# Patient Record
Sex: FEMALE | Race: WHITE
[De-identification: names, ages, dates, MRNs, and addresses within clinical notes are randomized per-mention and may not be internally consistent; named-entity substitution may affect disease eponyms.]

---
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
## 2017-09-23 NOTE — OBDEL
Birth Info


Birth Type: Vaginal


Presentation at Delivery: Vertex


L&D Analgesia/Anesthesia Type: Epidural


GBS+: Yes


Antibiotic Used for + GBS: Ampicillin (x3)


Intrapartum Medications: 





 











Generic Name Dose Route Start Last Admin





  Trade Name Frebrittany  PRN Reason Stop Dose Admin


 


Ampicillin Sodium 1 gm/ Sodium  100 mls @ 200 mls/hr  17 11:30  17 

19:33





  Chloride  IV  10/23/17 11:29  100 mls





  Q4H JESUS ALBERTO   Administration





  Protocol   


 


Oxytocin 30 unit/ Lactated  503 mls @ 0 mls/hr  17 13:00  17 13:08





  Ringer's  IV  18 12:59  Not Given





  CONT JESUS ALBERTO   





  Protocol   





  Per Protocol   














Discontinued Medications














Generic Name Dose Route Start Last Admin





  Trade Name Freq  PRN Reason Stop Dose Admin


 


Ampicillin Sodium 1 gm/ Sodium  100 mls @ 200 mls/hr  17 13:37  17 

11:38





  Chloride  IV  10/23/17 13:36  100 mls





  Q4H JESUS ALBERTO   Administration





  Protocol   














- Hospital Course


Intrapartum: 


Presents with +LOF at 0300-clear and occ ctx's. Pregnancy complicated by Rh 

negative-s/p Rhogam; Rubella non-immune. Hypothyroid-pt stopped Levo about 1 

week ago. Just got Tdap and wants flu vaccine. GBS+


17 23:41





Indications for Delivery: SROM





Vaginal Delivery





- Delivery Provider


Delivery Physician/CNM: Merna Castro





- Labor and Delivery


Onset of Contractions Date: 17


Onset of Contractions Time: 03:30


Onset of Contractions Type: Augmented


Rupture of Membranes Date: 17


Rupture of Membranes Time: 03:00


Rupture of Membranes Type: Spontaneous


Amniotic Fluid Color: Clear


Dilation Complete Date: 17


Dilation Complete Time: 21:55


Placenta Delivery Date: 17


Placenta Delivery Time: 23:15


Total Hours of Labor: 19


Laceration: 2nd Degree


Repair: 3-0, Vicryl


Vaginal Sponge Count Correct: Yes


Vaginal Needle Count Correct: Yes


Vaginal Sweep Performed: Yes


EBL: 300cc


Delivery Events: None


Delivery Comment: 





Uncomplicated





- Medications


Labor Augmentation/Induction Methods Used: Pitocin


Labor Augmentation/Induction Indication: Inadequate Ctx Strength (No cervical 

change)





[place] Birth Data


  ** Torres


Delivery Date: 17


Delivery Time: 23:10


LAZARO: 17


Gestational Age: 39 week(s) and 4 day(s)


Sex of Infant: Female


Apgar Score (1 Min): 8


Apgar Score (5 Min): 9





ICD10 Worksheet


Patient Problems: 


 Problems











Problem Status Onset


 


SROM (spontaneous rupture of membranes) Acute  


 


 (spontaneous vaginal delivery) Acute  














- ICD10 Problem Qualifiers


(1) SROM (spontaneous rupture of membranes)








(2)  (spontaneous vaginal delivery)

## 2017-09-23 NOTE — PREANESOB
Obstetric Pre-Anesthesia Info





- General Info


: 2


Para: 0





- Fetal Info


Fetal Status: Full Term


Fetal Monitors: External





- Labor Status


Cervical Dilation per last OB SVE: 3


Pitocin: In Use


Labor Epidural: Yes





Anesthesia


Allergies/Adverse Reactions: 


 











Allergy/AdvReac Type Severity Reaction Status Date / Time


 


No Known Allergies Allergy   Verified 12 02:33











Home Medications: 














 Medication  Instructions  Recorded


 


Fish Oil 1,200 mg Fish Oil 1 tab PO DAILY 17


 


Levothyroxine 0.25 mcg PO DAILY 17


 


Prenatal 1 tab PO DAILY 17


 


ZyrTEC 10 mg (*) 10 mg PO DAILY 17











Visit Medications: 





 











Generic Name Dose Route Start Last Admin





  Trade Name Freq  PRN Reason Stop Dose Admin


 


Lactated Ringer's  1,000 mls @ 0 mls/hr  17 09:36  





  Lr  IV  18 09:35  





  PRN PRN   





  SEE PROTOCOL CONDITIONS   





  Protocol   





  Per Protocol   


 


Oxytocin 20 unit/ Lactated  1,002 mls @ 150 mls/hr  17 09:46  





  Ringer's  IV   





  PRN PRN   





  POST-PARTUM BLEEDING   


 


Ampicillin Sodium 1 gm/ Sodium  100 mls @ 200 mls/hr  17 11:30  17 

15:55





  Chloride  IV  10/23/17 11:29  100 mls





  Q4H JESUS ALBERTO   Administration





  Protocol   


 


Lactated Ringer's  500 mls @ 500 mls/hr  17 12:24  





  Lr  IV   





  PRN PRN   





  Maternal Hypotension   


 


Oxytocin 30 unit/ Lactated  503 mls @ 0 mls/hr  17 13:00  17 13:08





  Ringer's  IV  18 12:59  Not Given





  CONT JESUS ALBERTO   





  Protocol   





  Per Protocol   


 


Ibuprofen  600 mg  17 09:36  





  Motrin  PO  18 09:35  





  Q6HRS PRN   





  post partum, inflammation   


 


Magnesium Sulfate  454 gm  17 09:36  





  Epsom Salt  TP  18 09:35  





  Q1H PRN   





  perineal discomfort    


 


Olive Oil  118 ml  17 09:36  





  Sweet Oil  MISC  18 09:35  





  ONCE PRN   





  perineal massage   


 


Terbutaline Sulfate  0.25 mg  17 09:36  





  Brethine  IV  18 09:35  





  ONCE PRN   





  Tachysystole   














Discontinued Medications














Generic Name Dose Route Start Last Admin





  Trade Name Evangelina  PRN Reason Stop Dose Admin


 


Ammonia (Aromatic Spirit)  Confirm  17 13:00  





  Ammonia Aromatic  Administered  17 13:01  





  Dose   





  1 each   





  IH   





  .STK-MED ONE   


 


Bupivacaine HCl  Confirm  17 15:21  





  Sensorcaine 0.25% Sdv  Administered  17 15:22  





  Dose   





  30 ml   





  .ROUTE   





  .STK-MED ONE   


 


Ephedrine Sulfate  Confirm  17 13:00  





  Ephedrine Sulfate  Administered  17 13:01  





  Dose   





  50 mg   





  .ROUTE   





  .STK-MED ONE   


 


Fentanyl/Bupivacaine HCl  Confirm  17 15:22  





  Fentanyl/Bupivacaine/Ns 2 Mcg/Ml 0.1% (Premix  Administered  17 15:23  





  Dose   





  100 ml   





  EP   





  .STK-MED ONE   


 


Ampicillin Sodium 1 gm/ Sodium  100 mls @ 200 mls/hr  17 13:37  17 

11:38





  Chloride  IV  10/23/17 13:36  100 mls





  Q4H JESUS ALBERTO   Administration





  Protocol   


 


Oxytocin/Lactated Ringer's  1,000 mls @ 150 mls/hr  17 09:36  





  Pitocin 20 Units/Lr (Premix)  IV   





  PRN PRN   





  Post-partum bleeding   


 


Oxytocin/Lactated Ringer's  500 mls @ 0 mls/hr  17 12:30  





  Pitocin 30 Units/Lr (Premix)  IV  18 12:29  





  CONT JESUS ALBERTO   





  Protocol   





  Per Protocol   


 


Lidocaine HCl  Confirm  17 13:00  





  Lidocaine Hcl 1%  Administered  17 13:01  





  Dose   





  300 mg   





  .ROUTE   





  .STK-MED ONE   


 


Misoprostol  Confirm  17 13:01  





  Cytotec  Administered  17 13:02  





  Dose   





  800 mcg   





  .ROUTE   





  .STK-MED ONE   


 


Olive Oil  Confirm  17 13:00  





  Sweet Oil  Administered  17 13:01  





  Dose   





  118 ml   





  .ROUTE   





  .STK-MED ONE   


 


Oxytocin  Confirm  17 13:01  





  Pitocin  Administered  17 13:02  





  Dose   





  40 unit   





  .ROUTE   





  .STK-MED ONE   


 


Phenylephrine HCl  Confirm  17 15:52  





  Neosynephrine  Administered  17 15:53  





  Dose   





  1,000 mcg   





  .ROUTE   





  .STK-MED ONE   


 


Terbutaline Sulfate  Confirm  17 13:00  





  Brethine  Administered  17 13:01  





  Dose   





  1 mg   





  .ROUTE   





  .STK-MED ONE   














- Anesthesia History


Response to Local Anesthetics: Normal


Anesthesia & Operative History: No Prior Problems





- Social History


Substance Use/Abuse: Denies





- Focused Exam


Height/Weight (Nursing): 





 











Height                         162.56 cm


 


Weight                         80.739 kg














ASA Status: I


Labs: 





 





 17 08:25 





 











Patient ABO/Rh  A NEGATIVE   17  08:25

## 2017-09-24 NOTE — OBPP
PostPartum Progress Note


Assessment/Plan: 


Assessment:











31 yo  PPD#1 s/p  on 17. Doing well.














Plan: Routine PP care.





17 13:27





Subjective/Postpartum Course: 





17 13:27


Patient is doing well. She is breastfeeding, she denies significant pain--well 

controlled with ibuprofen. She is ambulating and reports minimal lochia.


Objective: 





 





 17 08:25 





 











Patient ABO/Rh  A NEGATIVE   17  08:25    








 











Temp Pulse Resp BP Pulse Ox


 


 36.6 C   78   16   103/71   95 


 


 17 08:00  17 08:00  17 08:00  17 08:00  17 08:00








Gen: AAO x 3


HENT: atraumatic, normocephalic


Heart: RRR, no m/r/g


Lungs: CTAB


Abdomen: soft, non-tender, FF and below umbilicus


Extremities: no excessive swelling in BLE


Uterine Position/Fundal Height: Umbilicus -2


Uterine Tone: Firm

## 2017-09-25 NOTE — OBPP
PostPartum Progress Note


Assessment/Plan: 


Assessment:








1)  s/p  PPD # 2 - pt is stable





2)  Rh negative - Rhogam eval





3)  Rubella non-immune - to get MMR











Plan:





Plan for d/c home at noon


Instructions reviewed with pt


No RX given


Cont PNV and colace


Pelvic rest


RTC in 4 and 6 weeks for pp visit











17 09:44





Subjective/Postpartum Course: 





17 13:27


Patient is doing well. She is breastfeeding, she denies significant pain--well 

controlled with ibuprofen. She is ambulating and reports minimal lochia.





17 09:41


Pt seen and examined. Doing well with no complaints. Mild cramping. Lala regular 

diet, voiding and passing flatus. No BM yet. Mod lochia. BF well, but has 

really sore nipples. She has been working with lactation.











Objective: 





 





 17 08:25 





 











Patient ABO/Rh  A NEGATIVE   17  08:25    








 











Temp Pulse Resp BP Pulse Ox


 


 36.1 C   88   16   101/60   93 


 


 17 21:08  17 21:08  17 21:08  17 21:08  17 21:08











Uterine Position/Fundal Height: Umbilicus -2


Uterine Tone: Firm





PostPartum Physical Exam





- Physical Exam


Respiratory: lungs clear, normal breath sounds


Cardiac/Chest: regular rate, rhythm


Abdomen: normal bowel sounds, non-tender, soft, flatus (+)


Extremities: non-tender, normal inspection


Skin: normal color, warm/dry


Neuro/Psych: alert, normal mood/affect, oriented x 3

## 2017-09-25 NOTE — OBGCSDC
General Delivery Information





- General Info


: 2


Para: 1


Abortions: 0


Birth Type: Vaginal


L&D Analgesia/Anesthesia Type: Epidural


Admission Date: 17


Labs: 





 











Patient ABO/Rh  A NEGATIVE   17  08:25    


 


Hct  35.9 % (38.0-47.0)  L  17  08:25    














- Hospital Course


Antepartum: 





17 09:45


Pregnancy mostly uncomplicated. Rh negative, s/p Rhogam; Rubella non-immune. 

Hypothyroid-pt stopped Levo about 1 week ago. Just got Tdap and wants flu 

vaccine. GBS+








Intrapartum: 


Presents with +LOF at 0300-clear and occ ctx's.


17 23:41











Postpartum: 





17 13:27


Patient is doing well. She is breastfeeding, she denies significant pain--well 

controlled with ibuprofen. She is ambulating and reports minimal lochia.





17 09:41


Pt seen and examined. Doing well with no complaints. Mild cramping. Lala regular 

diet, voiding and passing flatus. No BM yet. Mod lochia. BF well, but has 

really sore nipples. She has been working with lactation.














Vaginal Birth





- Delivery Provider


Delivery Physician/CNM: Merna Castro





- Diagnosis


Labor: Augmented


Rupture of Membranes Type: Spontaneous


Amniotic Fluid Color: Clear


Laceration: 2nd Degree


Repair: 3-0, Vicryl


Delivery Events: None





 Birth





-  Delivery


EBL: 300cc





 Birth Data


  ** Torres


Delivery Date: 17


Delivery Time: 23:10


LAZARO: 17


Gestational Age: 39 week(s) and 6 day(s)


Sex of Infant: Female


 Weight (gm): 3050 g


Apgar Score (1 Min): 8


Apgar Score (5 Min): 9





Discharge Information





- Discharge Information


Condition: Good


Instruction/Follow Up: Four Weeks, Six Weeks

## 2018-04-17 NOTE — GHP
[f rep st]



                                                       PREOP HISTORY AND PHYSICAL





DATE OF ADMISSION:  04/18/2018



Patient is slated for surgery on 04/18/2018, on the Gynecology service.



HISTORY UPON PRESENTATION:  Patient is a 33-year-old, G3, P1, A1, who has been diagnosed with a misse
d AB at 8 weeks gestation.  She was found to have no fetal cardiac activity.  Visually, this was a pr
egnancy that looked to be a monochorionic, monoamniotic twin pregnancy that was likely a conjoined tw
in, which looked consistent with two separate cranial upper torso structures, but joined in a single 
lower torso structure, with a single cardiac structure.  The patient had previously been seen with duncan nguyen for an abnormally formed pregnancy on April 6, and there was a heart beat at that point of 14
8 beats per minute.  The patient has continued to have normal pregnancy symptoms and has not had any 
bleeding or cramping.  The patient is advised as to the options of management of missed AB, and she a
nd her  want to proceed with a D and C for definitive management.  The patient knows the optio
n of awaiting a miscarriage versus using Cytotec to induce a miscarriage.  The patient understands th
ere is low risk of complications or subsequent issues with scar tissue for her uterus.  Full informat
ion about the procedure will be discussed at bedside with Dr. Castro on the day of surgery, and the c
onsent forms will be signed then.  The patient has A-negative blood type, and is aware that she will 
want to have RhoGAM after the procedure in the hospital.



PAST MEDICAL HISTORY:  Abnormal Pap smear, at which time a colposcopy was performed in 2006, and the 
pathology was benign.  The patient had infertility issues in 2016 and saw [facility], and had an intrauteri
ne insemination.  This pregnancy was spontaneous and was unplanned, but welcomed.  The patient was co
ncerned when the pregnancy looked malformed, and has mixed emotions now about the pregnancy loss.  Th
e patient has a history of asthma and had a workup at [facility] that was negative.  The patient
 with hypothyroidism.  History of several UTIs and history of pyelonephritis.



PAST SURGICAL HISTORY:  Sinus surgery in 2005, omentectomy in the past.



PREGNANCY HISTORY:  Patient had a spontaneous AB at 4-5 weeks gestation in November 2016.  She did no
t receive RhoGAM at that time.  In September 2017, she had a viable female at 6 pounds 11.5 ounces by
 vaginal delivery with an epidural.



ALLERGIES:  The patient reports an allergy to doxycycline, with symptoms of headache and blurred visi
on.



CURRENT MEDICATIONS:  Levothyroxine at 25 mcg a day, prenatal vitamins, DHEA.



SOCIAL HISTORY:  The patient is , lives with her , Dimitrios, and their daughter, Nereida.  The
 patient is a nonsmoker.  No alcohol or drug use.



PHYSICAL EXAMINATION:  GENERAL:  At the time of preop, the patient is a well-developed, well-nourishe
d white female in no physical discomfort, but emotionally upset after the ultrasound.  VITAL SIGNS:  
The patient is clinically afebrile.  Blood pressure 98/70.  LUNGS:  Clear to auscultation bilaterally
.  CARDIOVASCULAR:  Regular rate and rhythm.  ABDOMEN:  Soft and nontender.  PELVIC:  Exam is not per
formed.



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  Patient wants to proceed with D and C for zheng terry, and this is scheduled on April 18.



PLAN:  The patient will be n.p.o. prior to the procedure.  She will have preoperative antibiotics, an
d she will receive RhoGAM after the procedure.  The patient is breastfeeding her 7-month-old and woul
d like to minimize anesthesia to continue breastfeeding.  Advised the patient to take her thyroid on 
schedule, but to defer her vitamins.





Job #:  [id number]

## 2018-04-18 NOTE — GOP
[f 
rep st]



                                                                OPERATIVE REPORT





DATE OF OPERATION:  18



SURGEON:  Merna Castro DO



ASSISTANT:  None.



ANESTHESIA:  LMA.



ANESTHESIOLOGIST:  Cuauhtemoc Mcknight MD.



PREOPERATIVE DIAGNOSIS:  Missed  at 8 weeks.



POSTOPERATIVE DIAGNOSIS:  Missed  at 8 weeks.



PROCEDURE PERFORMED:  Suction dilatation and curettage.



EBL:  100 cc



IVFs: 200 cc LR



FINDINGS:  On bimanual exam, the uterus was approximately 9-10 weeks, anteverted
, mobile.  Cervix was closed with no active bleeding noted.  The uterus sounded 
to 9-10 cm.  There was a moderate amount of tissue obtained. Minimal bleeding 
noted at the end of the procedure.



SPECIMENS:  Products of conception.



INDICATIONS:  Patient is a 33-year-old  3, para 1-0-1-1, with a missed 
AB at 8 weeks.  Patient presented to the office about a week ago with positive 
pregnancy test and had an ultrasound showing a viable mono-mono TIUP, however, 
likely conjoined with only one chest cavity and one heart.  Patient had a 
follow up visit with [facility] and had an ultrasound showing no heartbeat and a 
demise at 8 weeks.  Patient decided she wanted to proceed with surgery, suction 
D and C for removal of products of conception.  Surgical consents were obtained 
at bedside.  Discussed risks, benefits, and alternatives with the patient 
including, but not limited to, bleeding, infection, and risk of uterine 
perforation.  The patient understands all risks of surgery and wants to proceed 
with surgery.  



DESCRIPTION OF PROCEDURE:  Patient was taken to the operating room, where 
anesthesia was obtained without difficulty.  She was then positioned in the 
dorsal lithotomy position, prepped and draped in normal sterile fashion.  Once 
the anesthetic was found to be adequate, bimanual exam was performed.  Next, an 
open-ended speculum was placed in the vagina.  The anterior lip of the cervix 
was grasped with an Allis clamp.  The cervix was noted to be closed.  It was 
dilated up to a #9 Hegar.  The uterus was then sounded to 9-10 cm.  A curved 9 
suction curette was then used, connected to suction, placed in the cervix and 
gently passed up into the uterus.  A suction curettage was performed multiple 
times.  This was done under ultrasound guidance to visualize removal of 
products of conception.  Next, we turned our attention to sharp curettage.  
This was performed, only obtaining a small amount of tissue, and was done until 
a gritty texture was noted in all 4 quadrants of the uterus.  We then followed 
this with suction curettage to make sure there was removal of any remaining 
products of conception.  This was done under ultrasound, and again, a thin 
stripe was noted with no retaining products.  All instruments were then removed 
from the vagina. Minimal bleeding was noted. The patient tolerated the 
procedure well.  No complications.     Instruments, lap counts were correct x2. 
The patient was then taken out of lithotomy position, awakened, and taken to 
recovery room in stable condition.The patient is Rh negative, and will receive 
RhoGAM postoperatively in the PACU.    







Job #:  [id number]

MTDD

## 2018-04-18 NOTE — POSTOPPROG
Post Op Note


Date of Operation: 18


Surgeon: Merna Castro


Assistant: None


Anesthesiologist: Dr. Mcknight


Anesthesia: LMA


Pre-op Diagnosis: Missed Ab @ 8 weeks


Post-op Diagnosis: Missed Ab at 8 weeks


Indication: 34 y/o  w/ missed Ab at 8 wks; u/s showed mo-mo twins, likely 

conjoined


Procedure: Suction D&C under u/s guidance


Findings: Uterus sounded to 9-10 cm; cervix closed; mod amount POCs noted


Inf/Abcess present in the surg proc area at time of surgery?: No


Depth: Organ Space


EBL:  (100 cc)


Total fluids administered: 200 cc LR


Complications: 





None


Drains: Constavac


Specimen(s): 





POCs

## 2018-04-18 NOTE — PDANEPAE
ANE History of Present Illness


retained POC here for D+C








ANE Past Medical History





- Cardiovascular History


Hx Hypertension: No


Hx Arrhythmias: No


Hx Chest Pain: No





- Pulmonary History


Hx Sleep Apnea: No


Sleep Apnea Screening Result - Last Documented: Negative





- Endocrine History


Hx Diabetes: No


Hypothyroid: Yes





- Chronic Pain History


Chronic Pain: No





ANE Review of Systems


Review of Systems: 








- Exercise capacity


Exercise capacity: >=4 METS





ANE Patient History





- Allergies


Allergies/Adverse Reactions: 








doxycycline Allergy (Verified 04/18/18 06:47)


 








- Home Medications


Home Medications: 








Levothyroxine [Synthroid 25 mcg (*)]  04/18/18 [Last Taken 04/18/18 05:45]








- NPO status


NPO Status: no food or drink >8 hours


NPO Since - Liquids (Date): 04/17/18


NPO Since - Liquids (Time): 20:45


NPO Since - Solids (Date): 04/17/18


NPO Since - Solids (Time): 21:50





- Anes Hx


Anes Hx: no prior problems





- Smoking Hx


Smoking Status: Never smoked





- Alcohol Use


Alcohol Use: None





- Family Anes Hx


Family Anes Hx: none





ANE Labs/Vital Signs





- Vital Signs


Height: 160.02 cm


Weight: 65.771 kg





ANE Physical Exam





- Airway


Neck exam: FROM


Mallampati Score: Class 2


Mouth exam: normal dental/mouth exam





- Pulmonary


Pulmonary: no respiratory distress, clear to auscultation





- Cardiovascular


Cardiovascular: regular rate and rhythym, no murmur, rub, or gallop





- ASA Status


ASA Status: II





ANE Anesthesia Plan


Anesthesia Plan: GA w LMA, GA with mask

## 2018-04-22 NOTE — EDPHY
H & P


Stated Complaint: miscarraige/d&c 4 days ago, today pt large amt of bleeding/

cramping


Time Seen by Provider: 04/22/18 22:54


HPI/ROS: 





HPI





CHIEF COMPLAINT:  Vaginal bleeding., recent D and C





HISTORY OF PRESENT ILLNESS:  This patient 33-year-old female, she recently had 

a D and C on Wednesday for a conjoined twin miscarriage, the she states she had 

some light bleeding today however around 8:00 p.m. She started passing large 

blood clots and some abdominal cramping.  She reports 4-5 clots the size of her 

fist.  Denies any fever vomiting.  Denies significant abdominal pain but does 

have some pelvic cramping.  She called her OBGYN and was referred here to the 

emergency room.





Past Medical History:  Thyroid disease





Past Surgical History:  Recent D and C





Social History:  Denies daily use of drugs alcohol tobacco products.





Family History:  Noncontributory








ROS   


REVIEW OF SYSTEMS:


A comprehensive 10 point review of systems is otherwise negative aside from 

elements mentioned in the history of present illness.








Exam   


Constitutional appears well nontoxic no acute distress,  triage nursing summary 

reviewed, vital signs reviewed, awake/alert. 


Eyes   normal conjunctivae and sclera, EOMI, PERRLA. 


HENT   normal inspection, atraumatic, moist mucus membranes, no epistaxis, neck 

supple/ no meningismus, no raccoon eyes. 


Respiratory   clear to auscultation bilaterally, normal breath sounds, no 

respiratory distress, no wheezing. 


Cardiovascular   rate normal, regular rhythm, no murmur, no edema, distal 

pulses normal. 


Gastrointestinal   soft, non-tender, no rebound, no guarding, normal bowel 

sounds, no distension, no pulsatile mass. 


Genitourinary   pelvic exam:  Miley RN at bedside, external look.  No 

significant bleeding at this time.


Musculoskeletal  no midline vertebral tenderness, full range of motion, no calf 

swelling, no tenderness of extremities, no meningismus, good pulses, 

neurovascularly intact.


Skin   pink, warm, & dry, no rash, skin atraumatic. 


Neurologic   awake, alert and oriented x 3, AAOx3, moves all 4 extremities 

equally, motor intact, sensory intact, CN II-XII intact, normal cerebellar, 

normal vision, normal speech. 


Psychiatric   normal mood/affect. 


Heme/Lymph/Immune   no lymphadenopathy.





Differential Diagnosis:  Includes but is not limited to in a particular order 

acute pelvic bleeding, significant vaginal bleeding leading to blood loss anemia

, retained products





Medical Decision Making:  Plan for this patient IV establishment, check blood 

work, pelvic ultrasound, type and screen, re-evaluate.  OBGYN consult.





Re-evaluation:





2355:  Patient's ultrasound reviewed.  No evidence retained products.  Small 

amount of hematoma in the lower uterine segment.


Patient's vital signs are stable.


Patient's blood work is stable.





Spoke with Dr. Theresa Pérez.





2356:  Spoke Dr. Theresa Pérez reviewed the ultrasound blood work and vitals 

with her.  Recommends Methergine 0.2MG IM.  I have ordered this with the 

patient.  Given that the patient is not having significant bleeding here in 

ultrasound does not show evidence retained products H&H are stable vital signs 

are stable.  Most likely allow the patient to go home after period of 

observation after Methergine.  Discussed this with the patient.








0144:  This patient received Methergine, no significant abdominal pain she does 

have some mild cramping no significant bleeding she is requesting discharge 

home.





I went over return precautions with her she understands return emergency room 

she develops worsening abdominal pain, pelvic pain, cramping, bleeding.  She 

understands return emergency room she feels worse.  Additionally close follow 

up with OBGYN.





I did explain to her that she will have some vaginal bleeding and ongoing 

cramping today or for the next 24 hr given that she got IM Methergine.





If she has severe heavy bleeding syncope chest pain shortness of breath 

lightheadedness dizziness significant abdominal pain significant vaginal 

bleeding she should return emergency room she understands and is comfortable 

this plan.





Case discussed with Dr. Pérez.


Source: Patient





- Medical/Surgical History


Hx Asthma: No


Hx Chronic Respiratory Disease: No


Hx Diabetes: No


Hx Cardiac Disease: No


Hx Renal Disease: No


Hx Cirrhosis: No


Hx Alcoholism: No


Hx HIV/AIDS: No


Hx Splenectomy or Spleen Trauma: No


Other PMH: SINUS SURGERY 2005, hypothyroid





- Social History


Smoking Status: Never smoked


Constitutional: 


 Initial Vital Signs











Temperature (C)  36.4 C   04/22/18 22:43


 


Heart Rate  79   04/22/18 22:43


 


Respiratory Rate  16   04/22/18 22:43


 


Blood Pressure  122/79 H  04/22/18 22:43


 


O2 Sat (%)  96   04/22/18 22:43








 











O2 Delivery Mode               Room Air














Allergies/Adverse Reactions: 


 





doxycycline Allergy (Verified 04/22/18 22:45)


 








Home Medications: 














 Medication  Instructions  Recorded


 


Levothyroxine [Synthroid 25 mcg  04/18/18





(*)]  














Medical Decision Making





- Diagnostics


Imaging Results: 


 Imaging Impressions





Pelvic/Renal Ultrasound  04/22/18 22:55


Impression: Endometrial clot. No intrauterine vascular tissue to suggest 

retained products of conception.


 


Results called to Dr. Garcia at 11:40 PM.














- Data Points


Laboratory Results: 


 Laboratory Results





 04/22/18 23:05 





 04/22/18 23:05 





 











  04/22/18 04/22/18 04/22/18





  23:05 23:05 23:05


 


WBC      





    


 


RBC      





    


 


Hgb      





    


 


Hct      





    


 


MCV      





    


 


MCH      





    


 


MCHC      





    


 


RDW      





    


 


Plt Count      





    


 


MPV      





    


 


Neut % (Auto)      





    


 


Lymph % (Auto)      





    


 


Mono % (Auto)      





    


 


Eos % (Auto)      





    


 


Baso % (Auto)      





    


 


Nucleat RBC Rel Count      





    


 


Absolute Neuts (auto)      





    


 


Absolute Lymphs (auto)      





    


 


Absolute Monos (auto)      





    


 


Absolute Eos (auto)      





    


 


Absolute Basos (auto)      





    


 


Absolute Nucleated RBC      





    


 


Immature Gran %      





    


 


Immature Gran #      





    


 


PT    11.6 SEC L SEC  





    (12.0-15.0)  


 


INR    0.83   





    (0.83-1.16)  


 


APTT    23.8 SEC SEC  





    (23.0-38.0)  


 


Sodium      142 mEq/L mEq/L





     (135-145) 


 


Potassium      4.0 mEq/L mEq/L





     (3.5-5.2) 


 


Chloride      108 mEq/L mEq/L





     () 


 


Carbon Dioxide      26 mEq/l mEq/l





     (22-31) 


 


Anion Gap      8 mEq/L mEq/L





     (8-16) 


 


BUN      12 mg/dL mg/dL





     (7-23) 


 


Creatinine      0.6 mg/dL mg/dL





     (0.6-1.0) 


 


Estimated GFR      > 60 





    


 


Glucose      96 mg/dL mg/dL





     () 


 


Calcium      9.5 mg/dL mg/dL





     (8.5-10.4) 


 


Patient ABO/Rh  A NEGATIVE     





    


 


Antibody Screen  POSITIVE     





    


 


Antibody Identification  SIG ANTIBODIES RULED OUT     





    














  04/22/18





  23:05


 


WBC  7.92 10^3/uL 10^3/uL





   (3.80-9.50) 


 


RBC  4.25 10^6/uL 10^6/uL





   (4.18-5.33) 


 


Hgb  12.5 g/dL L g/dL





   (12.6-16.3) 


 


Hct  37.3 % L %





   (38.0-47.0) 


 


MCV  87.8 fL fL





   (81.5-99.8) 


 


MCH  29.4 pg pg





   (27.9-34.1) 


 


MCHC  33.5 g/dL g/dL





   (32.4-36.7) 


 


RDW  13.2 % %





   (11.5-15.2) 


 


Plt Count  233 10^3/uL 10^3/uL





   (150-400) 


 


MPV  9.1 fL fL





   (8.7-11.7) 


 


Neut % (Auto)  57.3 % %





   (39.3-74.2) 


 


Lymph % (Auto)  33.0 % %





   (15.0-45.0) 


 


Mono % (Auto)  7.1 % %





   (4.5-13.0) 


 


Eos % (Auto)  1.6 % %





   (0.6-7.6) 


 


Baso % (Auto)  0.5 % %





   (0.3-1.7) 


 


Nucleat RBC Rel Count  0.0 % %





   (0.0-0.2) 


 


Absolute Neuts (auto)  4.54 10^3/uL 10^3/uL





   (1.70-6.50) 


 


Absolute Lymphs (auto)  2.61 10^3/uL 10^3/uL





   (1.00-3.00) 


 


Absolute Monos (auto)  0.56 10^3/uL 10^3/uL





   (0.30-0.80) 


 


Absolute Eos (auto)  0.13 10^3/uL 10^3/uL





   (0.03-0.40) 


 


Absolute Basos (auto)  0.04 10^3/uL 10^3/uL





   (0.02-0.10) 


 


Absolute Nucleated RBC  0.00 10^3/uL 10^3/uL





   (0-0.01) 


 


Immature Gran %  0.5 % %





   (0.0-1.1) 


 


Immature Gran #  0.04 10^3/uL 10^3/uL





   (0.00-0.10) 


 


PT  





  


 


INR  





  


 


APTT  





  


 


Sodium  





  


 


Potassium  





  


 


Chloride  





  


 


Carbon Dioxide  





  


 


Anion Gap  





  


 


BUN  





  


 


Creatinine  





  


 


Estimated GFR  





  


 


Glucose  





  


 


Calcium  





  


 


Patient ABO/Rh  





  


 


Antibody Screen  





  


 


Antibody Identification  





  











Medications Given: 


 








Discontinued Medications





Sodium Chloride (Ns)  1,000 mls @ 0 mls/hr IV EDNOW ONE; Wide Open


   PRN Reason: Protocol


   Stop: 04/22/18 22:55


   Last Admin: 04/22/18 23:08 Dose:  1,000 mls


Methylergonovine Maleate (Methergine)  0.2 mg IM EDNOW ONE


   Stop: 04/22/18 23:50


   Last Admin: 04/23/18 00:23 Dose:  0.2 mg








Departure





- Departure


Disposition: Home, Routine, Self-Care


Clinical Impression: 


 Vaginal bleeding





Condition: Good


Instructions:  Threatened Miscarriage (ED)


Additional Instructions: 


1. Return emergency room if he develops worsening vaginal bleeding you severe 

abdominal pain or you feel ill lightheaded.


2. Please follow up with OBGYN.


3. Return emergency room if you have any worsening symptoms questions or 

concerns.


Referrals: 


Theresa Pérez MD [Primary Care Provider] - As per Instructions

## 2018-11-15 ENCOUNTER — HOSPITAL ENCOUNTER (OUTPATIENT)
Dept: HOSPITAL 80 - FIMAGING | Age: 33
End: 2018-11-15
Attending: OBSTETRICS & GYNECOLOGY
Payer: COMMERCIAL

## 2018-11-15 DIAGNOSIS — Z3A.20: ICD-10-CM

## 2018-11-15 DIAGNOSIS — O09.522: Primary | ICD-10-CM

## 2019-04-09 ENCOUNTER — HOSPITAL ENCOUNTER (INPATIENT)
Dept: HOSPITAL 80 - FLD | Age: 34
LOS: 2 days | Discharge: HOME | End: 2019-04-11
Attending: OBSTETRICS & GYNECOLOGY | Admitting: OBSTETRICS & GYNECOLOGY
Payer: COMMERCIAL

## 2019-04-09 DIAGNOSIS — O99.820: ICD-10-CM

## 2019-04-09 DIAGNOSIS — Z3A.40: ICD-10-CM

## 2019-04-09 DIAGNOSIS — O48.0: Primary | ICD-10-CM

## 2019-04-09 DIAGNOSIS — E03.9: ICD-10-CM

## 2019-04-09 DIAGNOSIS — O99.283: ICD-10-CM

## 2019-04-09 LAB — PLATELET # BLD: 179 10^3/UL (ref 150–400)

## 2019-04-09 PROCEDURE — 10907ZC DRAINAGE OF AMNIOTIC FLUID, THERAPEUTIC FROM PRODUCTS OF CONCEPTION, VIA NATURAL OR ARTIFICIAL OPENING: ICD-10-PCS | Performed by: OBSTETRICS & GYNECOLOGY

## 2019-04-09 PROCEDURE — 0KQM0ZZ REPAIR PERINEUM MUSCLE, OPEN APPROACH: ICD-10-PCS | Performed by: OBSTETRICS & GYNECOLOGY

## 2019-04-09 RX ADMIN — PENICILLIN G POTASSIUM SCH MLS: 20000000 POWDER, FOR SOLUTION INTRAVENOUS at 15:58

## 2019-04-09 RX ADMIN — PENICILLIN G POTASSIUM SCH MLS: 20000000 POWDER, FOR SOLUTION INTRAVENOUS at 12:02

## 2019-04-09 RX ADMIN — ACETAMINOPHEN SCH MG: 500 TABLET ORAL at 15:12

## 2019-04-09 NOTE — PREANESOB
Obstetric Pre-Anesthesia Info





- General Info


Proposed Procedure: ANTHONY


: 4


Para: 1


LAZARO: 19


Gestational Age: 40 week(s) and 5 day(s)





- Fetal Info


Fetal Status: Full Term, Torres


Fetal Monitors: External


FHR Pattern: Reassuring





- Labor Status


Indications for Labor Analgesia: Pain Control


Labor Epidural: Proposed





Anesthesia


Allergies/Adverse Reactions: 


 











Allergy/AdvReac Type Severity Reaction Status Date / Time


 


doxycycline Allergy   Verified 18 22:45











Home Medications: 














 Medication  Instructions  Recorded


 


Levothyroxine [Synthroid 25 mcg  18





(*)]  











Visit Medications: 





 











Generic Name Dose Route Start Last Admin





  Trade Name Freq  PRN Reason Stop Dose Admin


 


Ammonia (Aromatic Spirit)  1 each  19 06:47  





  Ammonia Aromatic  IH  19 06:46  





  ONCE PRN   





  Fainting   


 


Lactated Ringer's  1,000 mls @ 0 mls/hr  19 06:47  19 08:14





  Lr  IV  04/10/19 06:46  1,000 mls





  PRN PRN   Administration





  SEE PROTOCOL CONDITIONS   





  Protocol   





  Per Protocol   


 


Oxytocin/Lactated Ringer's  500 mls @ 0 mls/hr  19 06:47  19 10:04





  Pitocin 30 Units/Lr (Premix)  IV  10/06/19 06:46  500 mls





  CONT JESUS ALBERTO   Administration





  Protocol   





  Per Protocol   


 


Oxytocin/Lactated Ringer's  1,000 mls @ 0 mls/hr  19 06:47  





  Pitocin 20 Units/Lr (Premix)  IV   





  PRN PRN   





  Post partum bleeding   





  Per Protocol   


 


Penicillin G Potassium 2,500,  155 mls @ 155 mls/hr  19 12:00  19 12

:02





  000 unit/ Dextrose  IV  19 11:59  155 mls





  Q4H JESUS ALBERTO   Administration


 


Ibuprofen  600 mg  19 06:47  





  Motrin  PO   





  ONCE PRN   





  post partum, pain   


 


Lidocaine HCl  300 mg  19 06:47  





  Lidocaine Hcl 1%  SC  10/06/19 06:46  





  ONCE PRN   





  episiotomy   


 


Magnesium Sulfate  454 gm  19 06:47  





  Epsom Salt  TP  10/06/19 06:46  





  Q1H PRN   





  perineal discomfort    


 


Misoprostol  800 - 1,000 mcg  19 06:47  





  Cytotec  PO  10/06/19 06:46  





  ONCE PRN   





  Vaginal Atony/Bleeding   


 


Olive Oil  118 ml  19 06:47  





  Sweet Oil  MISC  10/06/19 06:46  





  ONCE PRN   





  perineal massage   


 


Terbutaline Sulfate  0.25 mg  19 06:47  





  Brethine  IV  10/06/19 06:46  





  ONCE PRN   





  Tachysystole   














Discontinued Medications














Generic Name Dose Route Start Last Admin





  Trade Name Freq  PRN Reason Stop Dose Admin


 


Ammonia (Aromatic Spirit)  Confirm  19 10:34  





  Ammonia Aromatic  Administered  19 10:35  





  Dose   





  1 each   





  IH   





  .STK-MED ONE   


 


Fentanyl/Bupivacaine HCl  Confirm  19 13:14  





  Fentanyl/Bupivacaine/Ns 2 Mcg/Ml 0.1% (Premix  Administered  19 13:15  





  Dose   





  100 ml   





  EP   





  .STK-MED ONE   


 


Lactated Ringer's  500 mls @ 500 mls/hr  19 06:47  





  Lr  IV  19 09:44  





  PRN PRN   





  Maternal Hypotension   


 


Penicillin G Potassium 5,000,  110 mls @ 110 mls/hr  19 07:45  19 08

:14





  000 unit/ Dextrose  IV  19 08:44  110 mls





  ONCE ONE   Administration


 


Lidocaine HCl  Confirm  19 10:34  





  Lidocaine Hcl 1%  Administered  19 10:35  





  Dose   





  300 mg   





  .ROUTE   





  .STK-MED ONE   


 


Misoprostol  Confirm  19 10:34  





  Cytotec  Administered  19 10:35  





  Dose   





  1,000 mcg   





  .ROUTE   





  .STK-MED ONE   


 


Olive Oil  Confirm  19 10:34  





  Sweet Oil  Administered  19 10:35  





  Dose   





  118 ml   





  MISC   





  .STK-MED ONE   


 


Oxytocin  Confirm  19 10:34  





  Pitocin  Administered  19 10:35  





  Dose   





  40 unit   





  .ROUTE   





  .STK-MED ONE   


 


Phenylephrine HCl  Confirm  19 13:14  





  Neosynephrine  Administered  19 13:15  





  Dose   





  1,000 mcg   





  .ROUTE   





  .STK-MED ONE   


 


Terbutaline Sulfate  Confirm  19 10:34  





  Brethine  Administered  19 10:35  





  Dose   





  1 mg   





  .ROUTE   





  .STK-MED ONE   














- Vital Signs


Height/Weight (Nursing): 





 











Height                         160.02 cm


 


Weight                         79.832 kg

















- Focused Exam


Neck exam: FROM


Mallampati Score: Class 3


Mouth exam: normal dental/mouth exam


Pulmonary: no respiratory distress, no rales or rhonchi, clear to auscultation


Cardiovascular: regular rate and rhythym, no murmur, rub, or gallop


Labs: 





 





 19 06:45 





 











Patient ABO/Rh  A NEGATIVE   19  06:45    














- Plan


Consent Signed and on Chart: Yes


Patient/Guardian Understands and Agrees to Plan: Yes

## 2019-04-09 NOTE — OBDEL
Birth Info


Birth Type: Vaginal


Presentation at Delivery: Vertex


L&D Analgesia/Anesthesia Type: Epidural


GBS+: Yes


Antibiotic Used for + GBS: Ampicillin (PCN x 3 doses)


Intrapartum Medications: 





 











Generic Name Dose Route Start Last Admin





  Trade Name Freq  PRN Reason Stop Dose Admin


 


Acetaminophen  1,000 mg  19 15:15  19 15:12





  Tylenol  PO  10/06/19 15:14  1,000 mg





  Q8HRS JESUS ALBERTO   Administration


 


Lactated Ringer's  1,000 mls @ 0 mls/hr  19 06:47  19 08:14





  Lr  IV  04/10/19 06:46  1,000 mls





  PRN PRN   Administration





  SEE PROTOCOL CONDITIONS   





  Protocol   





  Per Protocol   


 


Oxytocin/Lactated Ringer's  500 mls @ 0 mls/hr  19 06:47  19 10:04





  Pitocin 30 Units/Lr (Premix)  IV  10/06/19 06:46  500 mls





  CONT JESUS ALBERTO   Administration





  Protocol   





  Per Protocol   


 


Penicillin G Potassium 2,500,  155 mls @ 155 mls/hr  19 12:00  19 15

:58





  000 unit/ Dextrose  IV  19 11:59  155 mls





  Q4H JESUS ALBERTO   Administration














Discontinued Medications














Generic Name Dose Route Start Last Admin





  Trade Name Freq  PRN Reason Stop Dose Admin


 


Penicillin G Potassium 5,000,  110 mls @ 110 mls/hr  19 07:45  19 08

:14





  000 unit/ Dextrose  IV  19 08:44  110 mls





  ONCE ONE   Administration











Indications for Delivery: Elective





Vaginal Delivery





- Delivery Provider


Delivery Physician/CNM: Romario Najera





- Labor and Delivery


Onset of Contractions Date: 19


Onset of Contractions Time: 10:00


Onset of Contractions Type: Augmented


Rupture of Membranes Date: 19


Rupture of Membranes Time: 12:30


Rupture of Membranes Type: Artificial


Amniotic Fluid Color: Clear


Dilation Complete Date: 19


Dilation Complete Time: 16:58


Placenta Delivery Date: 19


Placenta Delivery Time: 17:19


Total Hours of Labor: 7


Non-surgical Procedures: Amniotomy


Laceration: 2nd Degree, Other (Specify) (Right max-urethral)


Repair: 3-0


Vaginal Sponge Count Correct: Yes


Vaginal Needle Count Correct: Yes


Vaginal Sweep Performed: Yes


EBL: 200


Delivery Events: Nuchal Cord (x1, loose and reduced)


Delivery Comment: 


Scheduled IOL for this AM, post-dates/elective/AMA.  4cm 





 Birth Data


LAZARO: 19


Gestational Age: 40 week(s) and 5 day(s)





ICD10 Worksheet


Patient Problems: 


 Problems











Problem Status Onset


 


Missed  Acute

## 2019-04-09 NOTE — PDGENHP
History and Physical





- Chief Complaint


IOL - Elective, AMA





- History of Present Illness


Carmen is a 33 yo  today at 40w5d who presents for IOL - she was 4cm in 

clinic last week.  IOL for AMA, elective, post-dates.  Daughter Nereida was born 

via  1.5yrs ago - uncomplicated . Pushed for about an hour for [id number] 

baby girl.  No significant lacerations, no PPH.  She did have an SAB prior to 

Flash pregnancy and an MAB with apparent "conjoined twins" that required D&C.





She reports that she's had the "stomach flu" over this past weekend and 

although overall feeling better - did have chills and rigors last night and got 

temp at home to 101.  We have not had temps here on admission, taking Tylenol.  

No diarrhea or vomiting past few days.





This pregnancy otherwise complicated by Rh neg, Hypothyroid on 50mcg levo, GBS 

positive, remote h/o pyelo.





Prenatal Labs:


A neg


Antibody negative


RPR NR


Rubella immune


Hep B neg


HIV neg


Standard neg CCRM


UDS neg


UCx neg


Pap ASCUS HPV neg


GCC neg


AFP neg


Innatal normal 


Glucola 96


Did get RhoGam 19


VZV immune


GBS POSITIVE





History Information





- Allergies/Home Medication List


Allergies/Adverse Reactions: 








doxycycline Allergy (Verified 18 22:45)


 





Home Medications: 








Levothyroxine [Synthroid 25 mcg (*)]  18 [Last Taken 18 05:45]





I have personally reviewed and updated: family history, medical history, social 

history, surgical history


Past Medical History: Hypothyroid, AMA, Rh neg, remote h/o pyelo, h/o 

depression anxiety (no meds)





- Surgical History


Additional surgical history: Sinus surgery, wisdom teeth





- Family History


Positive for: non-pertinent





- Social History


Smoking Status: Never smoked





Review of Systems


Review of Systems: 





ROS: 10pt was reviewed & negative except for what was stated in HPI & below





Physical Exam


Physical Exam: 


, mod lorna, accels present, no decels.


[place] irritable


Constitutional: no apparent distress, appears nourished, not in pain


Eyes: PERRL, anicteric sclera, EOMI


Ears, Nose, Mouth, Throat: moist mucous membranes





Lab Data & Imaging Review





 19 06:45

















WBC  13.15 10^3/uL (3.80-9.50)  H  19  06:45    


 


RBC  4.32 10^6/uL (4.18-5.33)   19  06:45    


 


Hgb  12.3 g/dL (12.6-16.3)  L  19  06:45    


 


Hct  36.0 % (38.0-47.0)  L  19  06:45    


 


MCV  83.3 fL (81.5-99.8)   19  06:45    


 


MCH  28.5 pg (27.9-34.1)   19  06:45    


 


MCHC  34.2 g/dL (32.4-36.7)   19  06:45    


 


RDW  14.7 % (11.5-15.2)   19  06:45    


 


Plt Count  179 10^3/uL (150-400)   19  06:45    


 


MPV  10.3 fL (8.7-11.7)   19  06:45    


 


Neut % (Auto)  83.6 % (39.3-74.2)  H  19  06:45    


 


Lymph % (Auto)  8.7 % (15.0-45.0)  L  19  06:45    


 


Mono % (Auto)  6.7 % (4.5-13.0)   19  06:45    


 


Eos % (Auto)  0.2 % (0.6-7.6)  L  19  06:45    


 


Baso % (Auto)  0.3 % (0.3-1.7)   19  06:45    


 


Nucleat RBC Rel Count  0.0 % (0.0-0.2)   19  06:45    


 


Absolute Neuts (auto)  10.98 10^3/uL (1.70-6.50)  H  19  06:45    


 


Absolute Lymphs (auto)  1.15 10^3/uL (1.00-3.00)   19  06:45    


 


Absolute Monos (auto)  0.88 10^3/uL (0.30-0.80)  H  19  06:45    


 


Absolute Eos (auto)  0.03 10^3/uL (0.03-0.40)   19  06:45    


 


Absolute Basos (auto)  0.04 10^3/uL (0.02-0.10)   19  06:45    


 


Absolute Nucleated RBC  0.00 10^3/uL (0-0.01)   19  06:45    


 


Immature Gran %  0.5 % (0.0-1.1)   19  06:45    


 


Immature Gran #  0.07 10^3/uL (0.00-0.10)   19  06:45    


 


Patient ABO/Rh  A NEGATIVE   19  06:45    


 


Antibody Screen  NEGATIVE   19  06:45    











Assessment & Plan


Assessment: 


33 yo  at 40w5d here for IOL.





She reports that she's had the "stomach flu" over this past weekend and 

although overall feeling better - did have chills and rigors last night and got 

temp at home to 101.  We have not had temps here on admission, taking Tylenol.  

No diarrhea or vomiting past few days.





IOL: PCN to start this AM, then Pitocin 2 hours after 1st dose is in.  AROM 

once in regular pattern and 2 doses abx infused.


Rh neg: Fetal screen and RhoGam PRN.


Hypothyroid: Continue 50mcg Levo (ordered).


GBS pos: PCN


Thinks she will want an epidural.


Stomach flu: Tylenol PRN, observe, we've not had any fevers here.  No other 

clear reason for temps at home.





ELVIE

## 2019-04-10 RX ADMIN — IBUPROFEN PRN MG: 600 TABLET ORAL at 12:55

## 2019-04-10 RX ADMIN — IBUPROFEN PRN MG: 600 TABLET ORAL at 18:36

## 2019-04-10 RX ADMIN — ACETAMINOPHEN SCH: 500 TABLET ORAL at 09:42

## 2019-04-10 RX ADMIN — ACETAMINOPHEN SCH: 500 TABLET ORAL at 16:51

## 2019-04-10 RX ADMIN — DOCUSATE SODIUM PRN MG: 100 CAPSULE, LIQUID FILLED ORAL at 09:19

## 2019-04-10 RX ADMIN — IBUPROFEN PRN MG: 600 TABLET ORAL at 00:02

## 2019-04-10 RX ADMIN — IBUPROFEN PRN MG: 600 TABLET ORAL at 06:11

## 2019-04-10 RX ADMIN — ACETAMINOPHEN PRN MG: 325 TABLET ORAL at 06:01

## 2019-04-10 RX ADMIN — IBUPROFEN PRN MG: 600 TABLET ORAL at 06:01

## 2019-04-10 RX ADMIN — ACETAMINOPHEN SCH MG: 500 TABLET ORAL at 00:02

## 2019-04-10 RX ADMIN — ACETAMINOPHEN PRN MG: 325 TABLET ORAL at 18:36

## 2019-04-10 RX ADMIN — ACETAMINOPHEN PRN MG: 325 TABLET ORAL at 12:53

## 2019-04-10 RX ADMIN — DOCUSATE SODIUM PRN MG: 100 CAPSULE, LIQUID FILLED ORAL at 22:03

## 2019-04-10 NOTE — POSTANESTH
Post Anesthetic Evaluation


Cardiovascular Status: Normal, Stable


Respiratory Status: Normal, Stable


Level of Consciousness/Mental Status: Can Participate in Eval


Pain Control: Adequate, Prn Tx Ordered


Nausea/Vomiting Control: Adequate, Prn Tx Ordered


Complications Possibly Related to Anesthesia: None Noted


Notes: 





s/p ANTHONY no adverse effects, no complaints.

## 2019-04-10 NOTE — OBPP
PostPartum Progress Note


Assessment/Plan: 


Assessment:


33 y/o PPD #1 s/p  after elective IOL @ 40 5/7 weeks.























Plan:


Lactation support and routine PPC.  D/c home tomorrow.  


04/10/19 10:26





Subjective/Postpartum Course: 





04/10/19 10:24


Pt is doing well.  She denies pain and is doing well with Ibuprofen and 

Tylenol.  She is ambulating and voiding without difficulty and has min lochia.  

Breast feeding is going well.  


Objective: 





 





 04/10/19 06:07 





 











Patient ABO/Rh  A NEGATIVE   19  06:45    








 











Temp Pulse Resp BP Pulse Ox


 


 36.6 C   86   14   118/84 H  94 


 


 04/10/19 08:00  04/10/19 08:00  04/10/19 08:00  04/10/19 08:00  04/10/19 08:00











Uterine Position/Fundal Height: Umbilicus -2


Uterine Tone: Firm





PostPartum Physical Exam





- Physical Exam


General Appearance: alert, no apparent distress


Neck: non-tender, full range of motion, supple


Respiratory: chest non-tender, lungs clear, normal breath sounds


Cardiac/Chest: regular rate, rhythm


Abdomen: normal bowel sounds


Extremities: swelling (no), Keron's sign (neg)

## 2019-04-11 VITALS — SYSTOLIC BLOOD PRESSURE: 117 MMHG | DIASTOLIC BLOOD PRESSURE: 83 MMHG

## 2019-04-11 RX ADMIN — IBUPROFEN PRN MG: 600 TABLET ORAL at 00:27

## 2019-04-11 RX ADMIN — ACETAMINOPHEN SCH: 500 TABLET ORAL at 01:12

## 2019-04-11 RX ADMIN — ACETAMINOPHEN PRN MG: 325 TABLET ORAL at 00:27

## 2019-04-11 RX ADMIN — IBUPROFEN PRN MG: 600 TABLET ORAL at 06:19

## 2019-04-11 RX ADMIN — ACETAMINOPHEN PRN MG: 325 TABLET ORAL at 06:18

## 2019-04-11 NOTE — OBPP
PostPartum Progress Note


Assessment/Plan: 


Assessment:











1)  33 y/o G4 now P2 s/p  @ 40 5/7 wks for elective IOL PPD #2 - pt is 

stable


     A-, RI, GBS pos.








2)  Hypothyroid - stable on meds








3)  Rh negative - s/p RhoGam











Plan:





Plan for d/c home today


Instructions reviewed with pt


Rx given for Apno cream


Pelvic rest


Cont PNV, Motrin and colace prn


RTC in 3 weeks for mood check and 6 weeks for pp visit











19 10:39





Subjective/Postpartum Course: 





04/10/19 10:24


Pt is doing well.  She denies pain and is doing well with Ibuprofen and 

Tylenol.  She is ambulating and voiding without difficulty and has min lochia.  

Breast feeding is going well.  


19 10:41


Pt seen and examined. Doing well with no complaints. Minimal cramping, relief 

with Motrin. Mod lochia. She is OOB, vern regular diet, voiding without 

difficulty and passing flatus. No BM yet. BF is going well. Ready to go home, 

but waiting to have baby boy circumcised. 


Objective: 





 





 04/10/19 06:07 





 











Patient ABO/Rh  A NEGATIVE   19  06:45    








 











Temp Pulse Resp BP Pulse Ox


 


 36.1 C   79   15   117/83 H  94 


 


 19 08:00  19 08:00  19 08:00  19 08:00  19 08:00











Uterine Position/Fundal Height: Umbilicus -2


Uterine Tone: Firm





PostPartum Physical Exam





- Physical Exam


General Appearance: WD/WN, alert, no apparent distress


Respiratory: lungs clear, normal breath sounds


Cardiac/Chest: regular rate, rhythm


Abdomen: normal bowel sounds, non-tender, soft, flatus (+)


Extremities: non-tender, normal inspection


Skin: normal color, warm/dry


Neuro/Psych: alert, oriented x 3

## 2019-04-11 NOTE — [facility]
General Delivery Information





- General Info


: 4


Para: 2


Abortions: 2


Birth Type: Vaginal


L&D Analgesia/Anesthesia Type: Epidural


Admission Date: 19


Labs: 





 











Patient ABO/Rh  A NEGATIVE   19  06:45    


 


Hct  36.1 % (38.0-47.0)  L  04/10/19  06:07    














- Hospital Course


Postpartum: 





04/10/19 10:24


Pt is doing well.  She denies pain and is doing well with Ibuprofen and 

Tylenol.  She is ambulating and voiding without difficulty and has min lochia.  

Breast feeding is going well.  


19 10:41


Pt seen and examined. Doing well with no complaints. Minimal cramping, relief 

with Motrin. Mod lochia. She is OOB, vern regular diet, voiding without 

difficulty and passing flatus. No BM yet. BF is going well. Ready to go home, 

but waiting to have baby boy circumcised. 





Vaginal Birth





- Delivery Provider


Delivery Physician/CNM: Romario Najera





- Diagnosis


Labor: Augmented


Rupture of Membranes Type: Artificial


Amniotic Fluid Color: Clear


Laceration: 2nd Degree, Other (Specify) (Right max-urethral)


Repair: 3-0


Delivery Events: Nuchal Cord (x1, loose and reduced)





- Procedures


Non-surgical Procedures: Amniotomy





 Birth





-  Delivery


Non-surgical Procedures: Amniotomy


EBL: 200





 Birth Data


LAZARO: 18


Gestational Age: 41 week(s) and 0 day(s)


  ** Torres


Delivery Date: 19


Delivery Time: 17:09


Sex of Infant: Male


[place] Weight (gm): 3778 g


Apgar Score (1 Min): 8


Apgar Score (5 Min): 9





Discharge Information





- Discharge Information


Condition: Good


Instruction/Follow Up: Four Weeks (3 weeks for a mood check), Six Weeks (for a 

routine pp visit)